# Patient Record
Sex: MALE
[De-identification: names, ages, dates, MRNs, and addresses within clinical notes are randomized per-mention and may not be internally consistent; named-entity substitution may affect disease eponyms.]

---

## 2019-01-09 ENCOUNTER — HOSPITAL ENCOUNTER (OUTPATIENT)
Dept: HOSPITAL 95 - SURS | Age: 34
Discharge: HOME | End: 2019-01-09
Attending: ORTHOPAEDIC SURGERY
Payer: COMMERCIAL

## 2019-01-09 VITALS — BODY MASS INDEX: 33.37 KG/M2 | HEIGHT: 74 IN | WEIGHT: 259.99 LBS

## 2019-01-09 DIAGNOSIS — S66.120A: Primary | ICD-10-CM

## 2019-01-09 PROCEDURE — 0LQ70ZZ REPAIR RIGHT HAND TENDON, OPEN APPROACH: ICD-10-PCS | Performed by: ORTHOPAEDIC SURGERY

## 2019-01-09 NOTE — NUR
Ambulatory in Day Surgery.
Surgical site prepped with 2% Chlorhexidine cloth wipe.
History, Chart, Medications and Allergies reviewed before start of
procedure. Lungs clear T/O to Auscultation.
Patient confirms NPO status and agrees with scheduled surgery.
Pre-Op teaching done. Pt verbalizes understanding.
Patient States Post-Procedure ride home has been arranged.

## 2023-10-31 NOTE — NUR
Caller: ALBIN ELIAS (NOT ON [facility] FOR PRESCRIPTIONS)    Relationship to patient: Emergency Contact    Best call back number: [phone]    Patient is needing:     WOULD LIKE TO KNOW IF PATIENT IS STILL NEEDING TO BE PRESCRIBED       metoprolol tartrate (LOPRESSOR) 25 MG tablet       PLEASE CALL TO DISCUSS. (AFTER 12PM)       Patient up to Ambulate independently. Gait steady.
Discharge instructions reviewed with patient. Patient verbalizes understanding.
Copy given to patient to take home.
Dressing to procedure site clean, dry, intact with no visible
drainage, swelling, erythema or bruising noted.
Patient States Post-Procedure ride home has been arranged.
Discharged via wheelchair to private car for ride home. Girlfriend at bedside
who is ride home.